# Patient Record
Sex: FEMALE | Race: WHITE | NOT HISPANIC OR LATINO | Employment: PART TIME | URBAN - METROPOLITAN AREA
[De-identification: names, ages, dates, MRNs, and addresses within clinical notes are randomized per-mention and may not be internally consistent; named-entity substitution may affect disease eponyms.]

---

## 2017-07-20 DIAGNOSIS — E55.9 VITAMIN D DEFICIENCY: Primary | ICD-10-CM

## 2017-07-20 LAB — 25(OH)D3+25(OH)D2 SERPL-MCNC: 26.3 NG/ML (ref 30–100)

## 2017-07-21 ENCOUNTER — TELEPHONE (OUTPATIENT)
Dept: INTERNAL MEDICINE | Facility: CLINIC | Age: 37
End: 2017-07-21

## 2017-07-21 NOTE — TELEPHONE ENCOUNTER
----- Message from Denice Gutierrez sent at 7/21/2017 10:20 AM EDT -----  Pt was in yesterday for fasting labs for her physical next week. She wants to know why they only did a Vitamin D lab? Phone: [phone]

## 2017-07-21 NOTE — TELEPHONE ENCOUNTER
Pt aware that there was a misunderstanding with her vit d lab only being drawn since the patient had never returned for a recheck. Pt was fine with just getting drawn at her CPE appt on 7/25/2017 & she will come fasting.

## 2017-11-13 DIAGNOSIS — Z00.00 HEALTHCARE MAINTENANCE: Primary | ICD-10-CM

## 2017-11-13 DIAGNOSIS — E55.9 VITAMIN D DEFICIENCY: ICD-10-CM

## 2017-11-13 DIAGNOSIS — R73.01 ELEVATED FASTING GLUCOSE: ICD-10-CM

## 2017-11-14 LAB
25(OH)D3+25(OH)D2 SERPL-MCNC: 33.1 NG/ML (ref 30–100)
ALBUMIN SERPL-MCNC: 4.7 G/DL (ref 3.5–5.2)
ALBUMIN/GLOB SERPL: 1.7 G/DL
ALP SERPL-CCNC: 48 U/L (ref 39–117)
ALT SERPL-CCNC: 9 U/L (ref 1–33)
AST SERPL-CCNC: 13 U/L (ref 1–32)
BASOPHILS # BLD AUTO: 0.01 10*3/MM3 (ref 0–0.2)
BASOPHILS NFR BLD AUTO: 0.2 % (ref 0–1.5)
BILIRUB SERPL-MCNC: 0.5 MG/DL (ref 0.1–1.2)
BUN SERPL-MCNC: 15 MG/DL (ref 6–20)
BUN/CREAT SERPL: 19.5 (ref 7–25)
CALCIUM SERPL-MCNC: 9.6 MG/DL (ref 8.6–10.5)
CHLORIDE SERPL-SCNC: 104 MMOL/L (ref 98–107)
CHOLEST SERPL-MCNC: 204 MG/DL (ref 0–200)
CHOLEST/HDLC SERPL: 4.08 {RATIO}
CO2 SERPL-SCNC: 25.3 MMOL/L (ref 22–29)
CREAT SERPL-MCNC: 0.77 MG/DL (ref 0.57–1)
EOSINOPHIL # BLD AUTO: 0.06 10*3/MM3 (ref 0–0.7)
EOSINOPHIL NFR BLD AUTO: 1.2 % (ref 0.3–6.2)
ERYTHROCYTE [DISTWIDTH] IN BLOOD BY AUTOMATED COUNT: 13 % (ref 11.7–13)
GFR SERPLBLD CREATININE-BSD FMLA CKD-EPI: 103 ML/MIN/1.73
GFR SERPLBLD CREATININE-BSD FMLA CKD-EPI: 85 ML/MIN/1.73
GLOBULIN SER CALC-MCNC: 2.7 GM/DL
GLUCOSE SERPL-MCNC: 103 MG/DL (ref 65–99)
HBA1C MFR BLD: 5.4 % (ref 4.8–5.6)
HCT VFR BLD AUTO: 40 % (ref 35.6–45.5)
HDLC SERPL-MCNC: 50 MG/DL (ref 40–60)
HGB BLD-MCNC: 12.8 G/DL (ref 11.9–15.5)
IMM GRANULOCYTES # BLD: 0 10*3/MM3 (ref 0–0.03)
IMM GRANULOCYTES NFR BLD: 0 % (ref 0–0.5)
LDLC SERPL CALC-MCNC: 132 MG/DL (ref 0–100)
LYMPHOCYTES # BLD AUTO: 1.99 10*3/MM3 (ref 0.9–4.8)
LYMPHOCYTES NFR BLD AUTO: 38.5 % (ref 19.6–45.3)
MCH RBC QN AUTO: 31.8 PG (ref 26.9–32)
MCHC RBC AUTO-ENTMCNC: 32 G/DL (ref 32.4–36.3)
MCV RBC AUTO: 99.5 FL (ref 80.5–98.2)
MONOCYTES # BLD AUTO: 0.32 10*3/MM3 (ref 0.2–1.2)
MONOCYTES NFR BLD AUTO: 6.2 % (ref 5–12)
NEUTROPHILS # BLD AUTO: 2.79 10*3/MM3 (ref 1.9–8.1)
NEUTROPHILS NFR BLD AUTO: 53.9 % (ref 42.7–76)
PLATELET # BLD AUTO: 235 10*3/MM3 (ref 140–500)
POTASSIUM SERPL-SCNC: 4.7 MMOL/L (ref 3.5–5.2)
PROT SERPL-MCNC: 7.4 G/DL (ref 6–8.5)
RBC # BLD AUTO: 4.02 10*6/MM3 (ref 3.9–5.2)
SODIUM SERPL-SCNC: 141 MMOL/L (ref 136–145)
TRIGL SERPL-MCNC: 108 MG/DL (ref 0–150)
VLDLC SERPL CALC-MCNC: 21.6 MG/DL (ref 5–40)
WBC # BLD AUTO: 5.17 10*3/MM3 (ref 4.5–10.7)

## 2017-11-17 ENCOUNTER — OFFICE VISIT (OUTPATIENT)
Dept: INTERNAL MEDICINE | Facility: CLINIC | Age: 37
End: 2017-11-17

## 2017-11-17 VITALS
HEIGHT: 62 IN | BODY MASS INDEX: 23.27 KG/M2 | WEIGHT: 126.44 LBS | DIASTOLIC BLOOD PRESSURE: 62 MMHG | SYSTOLIC BLOOD PRESSURE: 104 MMHG | HEART RATE: 85 BPM | OXYGEN SATURATION: 98 %

## 2017-11-17 DIAGNOSIS — Z00.00 HEALTHCARE MAINTENANCE: Primary | ICD-10-CM

## 2017-11-17 PROCEDURE — 99395 PREV VISIT EST AGE 18-39: CPT | Performed by: NURSE PRACTITIONER

## 2017-11-17 RX ORDER — CHOLECALCIFEROL (VITAMIN D3) 125 MCG
2 CAPSULE ORAL DAILY
COMMUNITY

## 2017-11-17 NOTE — PROGRESS NOTES
"Subjective   Sylvia Alvarado is a 37 y.o. female and is here for a comprehensive physical exam. The patient reports no problems.    Do you take any herbs or supplements that were not prescribed by a doctor? no  Are you taking aspirin daily? no     History:  She sees gynecology for her paps. She has not had a mammogram yet.     The following portions of the patient's history were reviewed and updated as appropriate: allergies, current medications, past family history, past medical history, past social history, past surgical history and problem list.    Review of Systems  Do you have pain that bothers you in your daily life? no  A comprehensive review of systems was negative.    Objective   /62 (BP Location: Left arm, Patient Position: Sitting, Cuff Size: Adult)  Pulse 85  Ht 62\" (157.5 cm)  Wt 126 lb 7 oz (57.4 kg)  SpO2 98%  BMI 23.13 kg/m2    General Appearance:    Alert, cooperative, no distress, appears stated age   Head:    Normocephalic, without obvious abnormality, atraumatic   Eyes:    PERRL, conjunctiva/corneas clear, EOM's intact, fundi     benign, both eyes   Ears:    Normal TM's and external ear canals, both ears   Nose:   Nares normal, septum midline, mucosa normal, no drainage    or sinus tenderness   Throat:   Lips, mucosa, and tongue normal; teeth and gums normal   Neck:   Supple, symmetrical, trachea midline, no adenopathy;     thyroid:  no enlargement/tenderness/nodules; no carotid    bruit or JVD   Back:     Symmetric, no curvature, ROM normal, no CVA tenderness   Lungs:     Clear to auscultation bilaterally, respirations unlabored   Chest Wall:    No tenderness or deformity    Heart:    Regular rate and rhythm, S1 and S2 normal, no murmur, rub   or gallop       Abdomen:     Soft, non-tender, bowel sounds active all four quadrants,     no masses, no organomegaly           Extremities:   Extremities normal, atraumatic, no cyanosis or edema   Pulses:   2+ and symmetric all extremities "   Skin:   Skin color, texture, turgor normal, no rashes or lesions   Lymph nodes:   Cervical, supraclavicular, and axillary nodes normal   Neurologic:   CNII-XII intact, normal strength, sensation and reflexes     throughout     Orders Only on 11/13/2017   Component Date Value Ref Range Status   • WBC 11/14/2017 5.17  4.50 - 10.70 10*3/mm3 Final   • RBC 11/14/2017 4.02  3.90 - 5.20 10*6/mm3 Final   • Hemoglobin 11/14/2017 12.8  11.9 - 15.5 g/dL Final   • Hematocrit 11/14/2017 40.0  35.6 - 45.5 % Final   • MCV 11/14/2017 99.5* 80.5 - 98.2 fL Final   • MCH 11/14/2017 31.8  26.9 - 32.0 pg Final   • MCHC 11/14/2017 32.0* 32.4 - 36.3 g/dL Final   • RDW 11/14/2017 13.0  11.7 - 13.0 % Final   • Platelets 11/14/2017 235  140 - 500 10*3/mm3 Final   • Neutrophil Rel % 11/14/2017 53.9  42.7 - 76.0 % Final   • Lymphocyte Rel % 11/14/2017 38.5  19.6 - 45.3 % Final   • Monocyte Rel % 11/14/2017 6.2  5.0 - 12.0 % Final   • Eosinophil Rel % 11/14/2017 1.2  0.3 - 6.2 % Final   • Basophil Rel % 11/14/2017 0.2  0.0 - 1.5 % Final   • Neutrophils Absolute 11/14/2017 2.79  1.90 - 8.10 10*3/mm3 Final   • Lymphocytes Absolute 11/14/2017 1.99  0.90 - 4.80 10*3/mm3 Final   • Monocytes Absolute 11/14/2017 0.32  0.20 - 1.20 10*3/mm3 Final   • Eosinophils Absolute 11/14/2017 0.06  0.00 - 0.70 10*3/mm3 Final   • Basophils Absolute 11/14/2017 0.01  0.00 - 0.20 10*3/mm3 Final   • Immature Granulocyte Rel % 11/14/2017 0.0  0.0 - 0.5 % Final   • Immature Grans Absolute 11/14/2017 0.00  0.00 - 0.03 10*3/mm3 Final   • Glucose 11/14/2017 103* 65 - 99 mg/dL Final   • BUN 11/14/2017 15  6 - 20 mg/dL Final   • Creatinine 11/14/2017 0.77  0.57 - 1.00 mg/dL Final   • eGFR Non  Am 11/14/2017 85  >60 mL/min/1.73 Final   • eGFR African Am 11/14/2017 103  >60 mL/min/1.73 Final   • BUN/Creatinine Ratio 11/14/2017 19.5  7.0 - 25.0 Final   • Sodium 11/14/2017 141  136 - 145 mmol/L Final   • Potassium 11/14/2017 4.7  3.5 - 5.2 mmol/L Final   • Chloride  11/14/2017 104  98 - 107 mmol/L Final   • Total CO2 11/14/2017 25.3  22.0 - 29.0 mmol/L Final   • Calcium 11/14/2017 9.6  8.6 - 10.5 mg/dL Final   • Total Protein 11/14/2017 7.4  6.0 - 8.5 g/dL Final   • Albumin 11/14/2017 4.70  3.50 - 5.20 g/dL Final   • Globulin 11/14/2017 2.7  gm/dL Final   • A/G Ratio 11/14/2017 1.7  g/dL Final   • Total Bilirubin 11/14/2017 0.5  0.1 - 1.2 mg/dL Final   • Alkaline Phosphatase 11/14/2017 48  39 - 117 U/L Final   • AST (SGOT) 11/14/2017 13  1 - 32 U/L Final   • ALT (SGPT) 11/14/2017 9  1 - 33 U/L Final   • Hemoglobin A1C 11/14/2017 5.40  4.80 - 5.60 % Final    Comment: Hemoglobin A1C Ranges:  Increased Risk for Diabetes  5.7% to 6.4%  Diabetes                     >= 6.5%  Diabetic Goal                < 7.0%     • 25 Hydroxy, Vitamin D 11/14/2017 33.1  30.0 - 100.0 ng/mL Final    Comment: Reference Range for Total Vitamin D 25(OH)  Deficiency    <20.0 ng/mL  Insufficiency 21-29 ng/mL  Sufficiency    ng/mL  Toxicity      >100 ng/ml        • Total Cholesterol 11/14/2017 204* 0 - 200 mg/dL Final   • Triglycerides 11/14/2017 108  0 - 150 mg/dL Final   • HDL Cholesterol 11/14/2017 50  40 - 60 mg/dL Final   • VLDL Cholesterol 11/14/2017 21.6  5 - 40 mg/dL Final   • LDL Cholesterol  11/14/2017 132* 0 - 100 mg/dL Final   • Chol/HDL Ratio 11/14/2017 4.08   Final     Reviewed lab results with patient.     Assessment/Plan   Healthy female exam.     1. Sylvia was seen today for annual exam.    Diagnoses and all orders for this visit:    Healthcare maintenance        2. Patient Counseling:  --Nutrition: Stressed importance of moderation in sodium/caffeine intake, saturated fat and cholesterol, caloric balance, sufficient intake of fresh fruits, vegetables, fiber, calcium, iron. Handout given on low cholesterol diet.   --Exercise: Stressed the importance of regular exercise. Patient does strength training, but not much cardio.  --Injury prevention: Discussed safety belts, safety  helmets, smoke detector.   --Dental health: Discussed importance of regular tooth brushing, flossing, and dental visits.  --Immunizations reviewed. TdaP within in the last 6 years      3. Discussed the patient's BMI with her.  The BMI is in the acceptable range  4. Follow up in one year to recheck cholesterol.

## 2018-12-18 DIAGNOSIS — E78.49 OTHER HYPERLIPIDEMIA: ICD-10-CM

## 2018-12-18 DIAGNOSIS — Z00.00 HEALTH CARE MAINTENANCE: Primary | ICD-10-CM

## 2018-12-18 DIAGNOSIS — E55.9 VITAMIN D DEFICIENCY: ICD-10-CM

## 2018-12-18 LAB
25(OH)D3+25(OH)D2 SERPL-MCNC: 42.4 NG/ML (ref 30–100)
ALBUMIN SERPL-MCNC: 4.9 G/DL (ref 3.5–5.2)
ALBUMIN/GLOB SERPL: 1.9 G/DL
ALP SERPL-CCNC: 50 U/L (ref 39–117)
ALT SERPL-CCNC: 7 U/L (ref 1–33)
AST SERPL-CCNC: 13 U/L (ref 1–32)
BILIRUB SERPL-MCNC: 0.4 MG/DL (ref 0.1–1.2)
BUN SERPL-MCNC: 17 MG/DL (ref 6–20)
BUN/CREAT SERPL: 19.8 (ref 7–25)
CALCIUM SERPL-MCNC: 9.6 MG/DL (ref 8.6–10.5)
CHLORIDE SERPL-SCNC: 105 MMOL/L (ref 98–107)
CHOLEST SERPL-MCNC: 201 MG/DL (ref 0–200)
CHOLEST/HDLC SERPL: 3.72 {RATIO}
CO2 SERPL-SCNC: 24.3 MMOL/L (ref 22–29)
CREAT SERPL-MCNC: 0.86 MG/DL (ref 0.57–1)
GLOBULIN SER CALC-MCNC: 2.6 GM/DL
GLUCOSE SERPL-MCNC: 103 MG/DL (ref 65–99)
HBA1C MFR BLD: 5 % (ref 4.8–5.6)
HDLC SERPL-MCNC: 54 MG/DL (ref 40–60)
LDLC SERPL CALC-MCNC: 124 MG/DL (ref 0–100)
POTASSIUM SERPL-SCNC: 4.4 MMOL/L (ref 3.5–5.2)
PROT SERPL-MCNC: 7.5 G/DL (ref 6–8.5)
SODIUM SERPL-SCNC: 141 MMOL/L (ref 136–145)
TRIGL SERPL-MCNC: 117 MG/DL (ref 0–150)
VLDLC SERPL CALC-MCNC: 23.4 MG/DL (ref 5–40)

## 2018-12-20 ENCOUNTER — OFFICE VISIT (OUTPATIENT)
Dept: INTERNAL MEDICINE | Facility: CLINIC | Age: 38
End: 2018-12-20

## 2018-12-20 VITALS
OXYGEN SATURATION: 100 % | SYSTOLIC BLOOD PRESSURE: 102 MMHG | HEIGHT: 62 IN | WEIGHT: 119.19 LBS | DIASTOLIC BLOOD PRESSURE: 68 MMHG | HEART RATE: 76 BPM | BODY MASS INDEX: 21.93 KG/M2

## 2018-12-20 DIAGNOSIS — Z00.00 HEALTHCARE MAINTENANCE: Primary | ICD-10-CM

## 2018-12-20 PROCEDURE — 99395 PREV VISIT EST AGE 18-39: CPT | Performed by: NURSE PRACTITIONER

## 2018-12-20 RX ORDER — SPIRONOLACTONE 50 MG/1
1.5 TABLET, FILM COATED ORAL
COMMUNITY
Start: 2018-12-19 | End: 2021-04-06

## 2018-12-20 NOTE — PROGRESS NOTES
"Subjective   Sylvia Alvarado is a 38 y.o. female and is here for a comprehensive physical exam.   The patient reports :  C/o epigastric pain that occurred when she bent over. She felt something like a \"muscle tightening.\" This happened twice. No heartburn. Denies any injury or new exercises.     Do you take any herbs or supplements that were not prescribed by a doctor? vitamin D  Are you taking aspirin daily? no     History:  Last pap April 2018. No mammogram yet    The following portions of the patient's history were reviewed and updated as appropriate: allergies, current medications, past family history, past medical history, past social history, past surgical history and problem list.    Review of Systems  Do you have pain that bothers you in your daily life? no  Pertinent items are noted in HPI.    Objective   /68 (BP Location: Left arm, Patient Position: Sitting, Cuff Size: Adult)   Pulse 76   Ht 157.5 cm (62\")   Wt 54.1 kg (119 lb 3 oz)   SpO2 100%   BMI 21.80 kg/m²     General Appearance:    Alert, cooperative, no distress, appears stated age   Head:    Normocephalic, without obvious abnormality, atraumatic   Eyes:    PERRL, conjunctiva/corneas clear, EOM's intact, fundi     benign, both eyes   Ears:    Normal TM's and external ear canals, both ears   Nose:   Nares normal, septum midline, mucosa normal, no drainage    or sinus tenderness   Throat:   Lips, mucosa, and tongue normal; teeth and gums normal   Neck:   Supple, symmetrical, trachea midline, no adenopathy;     thyroid:  no enlargement/tenderness/nodules; no carotid    bruit or JVD   Back:     Symmetric, no curvature, ROM normal, no CVA tenderness   Lungs:     Clear to auscultation bilaterally, respirations unlabored   Chest Wall:    No tenderness or deformity    Heart:    Regular rate and rhythm, S1 and S2 normal, no murmur, rub   or gallop       Abdomen:     Soft, non-tender, bowel sounds active all four quadrants,     no masses, no " organomegaly           Extremities:   Extremities normal, atraumatic, no cyanosis or edema   Pulses:   2+ and symmetric all extremities   Skin:   Skin color, texture, turgor normal, no rashes or lesions   Lymph nodes:   Cervical, supraclavicular, and axillary nodes normal   Neurologic:   CNII-XII intact, normal strength, sensation and reflexes     throughout     Orders Only on 12/18/2018   Component Date Value Ref Range Status   • Glucose 12/18/2018 103* 65 - 99 mg/dL Final   • BUN 12/18/2018 17  6 - 20 mg/dL Final   • Creatinine 12/18/2018 0.86  0.57 - 1.00 mg/dL Final   • eGFR Non  Am 12/18/2018 75  >60 mL/min/1.73 Final   • eGFR African Am 12/18/2018 90  >60 mL/min/1.73 Final   • BUN/Creatinine Ratio 12/18/2018 19.8  7.0 - 25.0 Final   • Sodium 12/18/2018 141  136 - 145 mmol/L Final   • Potassium 12/18/2018 4.4  3.5 - 5.2 mmol/L Final   • Chloride 12/18/2018 105  98 - 107 mmol/L Final   • Total CO2 12/18/2018 24.3  22.0 - 29.0 mmol/L Final   • Calcium 12/18/2018 9.6  8.6 - 10.5 mg/dL Final   • Total Protein 12/18/2018 7.5  6.0 - 8.5 g/dL Final   • Albumin 12/18/2018 4.90  3.50 - 5.20 g/dL Final   • Globulin 12/18/2018 2.6  gm/dL Final   • A/G Ratio 12/18/2018 1.9  g/dL Final   • Total Bilirubin 12/18/2018 0.4  0.1 - 1.2 mg/dL Final   • Alkaline Phosphatase 12/18/2018 50  39 - 117 U/L Final   • AST (SGOT) 12/18/2018 13  1 - 32 U/L Final   • ALT (SGPT) 12/18/2018 7  1 - 33 U/L Final   • Total Cholesterol 12/18/2018 201* 0 - 200 mg/dL Final   • Triglycerides 12/18/2018 117  0 - 150 mg/dL Final   • HDL Cholesterol 12/18/2018 54  40 - 60 mg/dL Final   • VLDL Cholesterol 12/18/2018 23.4  5 - 40 mg/dL Final   • LDL Cholesterol  12/18/2018 124* 0 - 100 mg/dL Final   • Chol/HDL Ratio 12/18/2018 3.72   Final   • Hemoglobin A1C 12/18/2018 5.00  4.80 - 5.60 % Final    Comment: Hemoglobin A1C Ranges:  Increased Risk for Diabetes  5.7% to 6.4%  Diabetes                     >= 6.5%  Diabetic Goal                < 7.0%      • 25 Hydroxy, Vitamin D 12/18/2018 42.4  30.0 - 100.0 ng/ml Final    Comment: Reference Range for Total Vitamin D 25(OH)  Deficiency    <20.0 ng/mL  Insufficiency 21-29 ng/mL  Sufficiency    ng/mL  Toxicity      >100 ng/ml          Reviewed labs with patient.        Assessment/Plan   Healthy female exam.      1. Sylvia was seen today for annual exam.    Diagnoses and all orders for this visit:    Healthcare maintenance        2. Patient Counseling:  --Nutrition: Stressed importance of moderation in sodium/caffeine intake, saturated fat and cholesterol, caloric balance, sufficient intake of fresh fruits, vegetables, fiber, calcium, iron.   --Exercise: Stressed the importance of regular exercise.   --Injury prevention: Discussed safety belts, safety helmets, smoke detector.   --Dental health: Discussed importance of regular tooth brushing, flossing, and dental visits.   --Immunizations reviewed.    3. Discussed the patient's BMI with her.  The BMI is in the acceptable range  4. Follow up in one year for physical and labs. She will call if she has any further episodes of abdominal discomfort.

## 2021-01-05 ENCOUNTER — TELEPHONE (OUTPATIENT)
Dept: INTERNAL MEDICINE | Facility: CLINIC | Age: 41
End: 2021-01-05

## 2021-01-05 NOTE — TELEPHONE ENCOUNTER
PATIENT HAS PCP OF DEVONTE ARAIZA BUT WANTS TO SWITCH TO DR CRONIN/PATIENT WANTED TO STATE SHE HAS NO ISSUES WITH ANYONE BUT FEELS DR CRONIN IS A BETTER FIT FOR HER NEEDS AND WOULD LIKE TO SWITCH HER PCP TO DR CRONIN/PLEASE CALL PATIENT TO COORDINATE SWITCHING PCP

## 2021-04-06 ENCOUNTER — TRANSCRIBE ORDERS (OUTPATIENT)
Dept: ADMINISTRATIVE | Facility: HOSPITAL | Age: 41
End: 2021-04-06

## 2021-04-06 ENCOUNTER — OFFICE VISIT (OUTPATIENT)
Dept: INTERNAL MEDICINE | Facility: CLINIC | Age: 41
End: 2021-04-06

## 2021-04-06 VITALS
WEIGHT: 129.3 LBS | SYSTOLIC BLOOD PRESSURE: 102 MMHG | DIASTOLIC BLOOD PRESSURE: 60 MMHG | TEMPERATURE: 97.8 F | HEIGHT: 62 IN | BODY MASS INDEX: 23.79 KG/M2 | OXYGEN SATURATION: 97 % | HEART RATE: 93 BPM

## 2021-04-06 DIAGNOSIS — Z13.6 ENCOUNTER FOR SCREENING FOR VASCULAR DISEASE: Primary | ICD-10-CM

## 2021-04-06 DIAGNOSIS — Z00.00 HEALTH CARE MAINTENANCE: Primary | ICD-10-CM

## 2021-04-06 DIAGNOSIS — Z20.822 EXPOSURE TO COVID-19 VIRUS: ICD-10-CM

## 2021-04-06 PROCEDURE — 99395 PREV VISIT EST AGE 18-39: CPT | Performed by: NURSE PRACTITIONER

## 2021-04-06 NOTE — PROGRESS NOTES
Subjective   Sylvia Alvarado is a 40 y.o. female.     History of Present Illness   The patient is here today for CPE and to establish care. She does eat healthy, she does exercise 3 times a week.     Would like COVID antibody test.    Acne- off spironolactone for several months  Anxiety- worse around periods, was on cymbalta, off for about 6 months    G-2 P-2   Dtrs are 9 and 6.     She takes vitality essential nutrition pack, MVI, Fish oil, Cellwise and probiotic, Vit D3 2000 IU daily   The following portions of the patient's history were reviewed and updated as appropriate: allergies, current medications, past family history, past medical history, past social history, past surgical history and problem list.    Review of Systems   Constitutional: Negative for chills, fatigue and fever.   HENT: Negative for ear pain, rhinorrhea and sore throat.    Eyes: Negative.    Respiratory: Negative for cough and shortness of breath.    Cardiovascular: Negative.    Gastrointestinal: Negative.    Endocrine: Negative for cold intolerance and heat intolerance.   Genitourinary: Positive for frequency. Negative for breast discharge, breast lump, breast pain, difficulty urinating, dyspareunia, dysuria, flank pain, genital sores, hematuria, pelvic pain and urgency.   Musculoskeletal: Negative.    Skin: Negative.    Allergic/Immunologic: Negative for environmental allergies and food allergies.   Neurological: Negative.    Hematological: Negative.    Psychiatric/Behavioral: Negative for dysphoric mood and suicidal ideas. The patient is not nervous/anxious.        Objective   Physical Exam  Constitutional:       Appearance: Normal appearance. She is well-developed.      Comments: Body mass index is 23.65 kg/m².     HENT:      Right Ear: Hearing, tympanic membrane, ear canal and external ear normal.      Left Ear: Hearing, tympanic membrane, ear canal and external ear normal.      Nose: Nose normal.      Mouth/Throat:      Pharynx:  Uvula midline.   Eyes:      General: Lids are normal.      Conjunctiva/sclera: Conjunctivae normal.      Pupils: Pupils are equal, round, and reactive to light.   Neck:      Thyroid: No thyroid mass or thyromegaly.      Vascular: No carotid bruit.      Trachea: Trachea normal.   Cardiovascular:      Rate and Rhythm: Normal rate and regular rhythm.      Pulses:           Carotid pulses are 1+ on the left side.     Heart sounds: Normal heart sounds.   Pulmonary:      Effort: Pulmonary effort is normal. No retractions.      Breath sounds: Normal breath sounds.   Chest:      Chest wall: No mass, lacerations, deformity, swelling, tenderness, crepitus or edema.      Breasts: Breasts are symmetrical.         Right: No inverted nipple, mass, nipple discharge, skin change or tenderness.         Left: No inverted nipple, mass, nipple discharge, skin change or tenderness.      Comments: L>R, baseline per patient  Abdominal:      General: Bowel sounds are normal.      Palpations: Abdomen is soft.      Tenderness: There is no abdominal tenderness.   Musculoskeletal:         General: Normal range of motion.      Cervical back: Normal range of motion.   Lymphadenopathy:      Cervical: No cervical adenopathy.      Upper Body:      Right upper body: No supraclavicular adenopathy.      Left upper body: No supraclavicular adenopathy.      Lower Body: No right inguinal adenopathy. No left inguinal adenopathy.   Skin:     General: Skin is warm and dry.   Neurological:      Mental Status: She is alert and oriented to person, place, and time.      GCS: GCS eye subscore is 4. GCS verbal subscore is 5. GCS motor subscore is 6.      Cranial Nerves: No cranial nerve deficit.      Sensory: No sensory deficit.      Deep Tendon Reflexes:      Reflex Scores:       Patellar reflexes are 2+ on the right side and 2+ on the left side.  Psychiatric:         Speech: Speech normal.         Behavior: Behavior normal. Behavior is cooperative.          Thought Content: Thought content normal.         Judgment: Judgment normal.         Vitals:    04/06/21 1257   BP: 102/60   Pulse: 93   Temp: 97.8 °F (36.6 °C)   SpO2: 97%     Body mass index is 23.65 kg/m².      Assessment/Plan   Diagnoses and all orders for this visit:    1. Health care maintenance (Primary)  -     CBC & Differential  -     Comprehensive Metabolic Panel  -     Hemoglobin A1c  -     Lipid Panel With LDL / HDL Ratio  -     TSH Rfx On Abnormal To Free T4  -     Vitamin D 25 Hydroxy  -     Hepatitis C Antibody    2. Exposure to COVID-19 virus  -     SARS-CoV-2 Antibodies ([facility])                 1. Preventative counseling- continue healthy diet and exercise  2. Hyperglycemia- check A1C, watch carbs, exercise   3. Bruit carotid- check US    COVID vaccine discussed  GYN- UTD  Fasting labs and will call

## 2021-04-09 LAB
25(OH)D3+25(OH)D2 SERPL-MCNC: 40.3 NG/ML (ref 30–100)
ALBUMIN SERPL-MCNC: 4.8 G/DL (ref 3.5–5.2)
ALBUMIN/GLOB SERPL: 1.9 G/DL
ALP SERPL-CCNC: 47 U/L (ref 39–117)
ALT SERPL-CCNC: 15 U/L (ref 1–33)
AST SERPL-CCNC: 17 U/L (ref 1–32)
BASOPHILS # BLD AUTO: 0.04 10*3/MM3 (ref 0–0.2)
BASOPHILS NFR BLD AUTO: 0.8 % (ref 0–1.5)
BILIRUB SERPL-MCNC: 0.5 MG/DL (ref 0–1.2)
BUN SERPL-MCNC: 20 MG/DL (ref 6–20)
BUN/CREAT SERPL: 25 (ref 7–25)
CALCIUM SERPL-MCNC: 10 MG/DL (ref 8.6–10.5)
CHLORIDE SERPL-SCNC: 106 MMOL/L (ref 98–107)
CHOLEST SERPL-MCNC: 183 MG/DL (ref 0–200)
CO2 SERPL-SCNC: 24.2 MMOL/L (ref 22–29)
CREAT SERPL-MCNC: 0.8 MG/DL (ref 0.57–1)
EOSINOPHIL # BLD AUTO: 0.06 10*3/MM3 (ref 0–0.4)
EOSINOPHIL NFR BLD AUTO: 1.3 % (ref 0.3–6.2)
ERYTHROCYTE [DISTWIDTH] IN BLOOD BY AUTOMATED COUNT: 12.1 % (ref 12.3–15.4)
GLOBULIN SER CALC-MCNC: 2.5 GM/DL
GLUCOSE SERPL-MCNC: 95 MG/DL (ref 65–99)
HBA1C MFR BLD: 5.3 % (ref 4.8–5.6)
HCT VFR BLD AUTO: 40.7 % (ref 34–46.6)
HDLC SERPL-MCNC: 56 MG/DL (ref 40–60)
HGB BLD-MCNC: 13.6 G/DL (ref 12–15.9)
IMM GRANULOCYTES # BLD AUTO: 0.01 10*3/MM3 (ref 0–0.05)
IMM GRANULOCYTES NFR BLD AUTO: 0.2 % (ref 0–0.5)
LDLC SERPL CALC-MCNC: 106 MG/DL (ref 0–100)
LDLC/HDLC SERPL: 1.85 {RATIO}
LYMPHOCYTES # BLD AUTO: 1.44 10*3/MM3 (ref 0.7–3.1)
LYMPHOCYTES NFR BLD AUTO: 30.6 % (ref 19.6–45.3)
MCH RBC QN AUTO: 31.9 PG (ref 26.6–33)
MCHC RBC AUTO-ENTMCNC: 33.4 G/DL (ref 31.5–35.7)
MCV RBC AUTO: 95.5 FL (ref 79–97)
MONOCYTES # BLD AUTO: 0.35 10*3/MM3 (ref 0.1–0.9)
MONOCYTES NFR BLD AUTO: 7.4 % (ref 5–12)
NEUTROPHILS # BLD AUTO: 2.81 10*3/MM3 (ref 1.7–7)
NEUTROPHILS NFR BLD AUTO: 59.7 % (ref 42.7–76)
NRBC BLD AUTO-RTO: 0 /100 WBC (ref 0–0.2)
PLATELET # BLD AUTO: 235 10*3/MM3 (ref 140–450)
POTASSIUM SERPL-SCNC: 4.7 MMOL/L (ref 3.5–5.2)
PROT SERPL-MCNC: 7.3 G/DL (ref 6–8.5)
RBC # BLD AUTO: 4.26 10*6/MM3 (ref 3.77–5.28)
SODIUM SERPL-SCNC: 140 MMOL/L (ref 136–145)
TRIGL SERPL-MCNC: 116 MG/DL (ref 0–150)
TSH SERPL DL<=0.005 MIU/L-ACNC: 2.23 UIU/ML (ref 0.27–4.2)
VLDLC SERPL CALC-MCNC: 21 MG/DL (ref 5–40)
WBC # BLD AUTO: 4.71 10*3/MM3 (ref 3.4–10.8)

## 2021-04-16 ENCOUNTER — BULK ORDERING (OUTPATIENT)
Dept: CASE MANAGEMENT | Facility: OTHER | Age: 41
End: 2021-04-16

## 2021-04-16 DIAGNOSIS — Z23 IMMUNIZATION DUE: ICD-10-CM

## 2021-05-18 ENCOUNTER — HOSPITAL ENCOUNTER (OUTPATIENT)
Dept: CARDIOLOGY | Facility: HOSPITAL | Age: 41
Discharge: HOME | End: 2021-05-18
Admitting: SURGERY

## 2021-05-18 VITALS
DIASTOLIC BLOOD PRESSURE: 59 MMHG | HEART RATE: 75 BPM | BODY MASS INDEX: 23 KG/M2 | SYSTOLIC BLOOD PRESSURE: 115 MMHG | WEIGHT: 125 LBS | HEIGHT: 62 IN

## 2021-05-18 DIAGNOSIS — Z13.6 ENCOUNTER FOR SCREENING FOR VASCULAR DISEASE: ICD-10-CM

## 2021-05-18 LAB
BH CV VAS BP LEFT ARM: NORMAL MMHG
BH CV VAS BP RIGHT ARM: NORMAL MMHG
BH CV XLRA MEAS LEFT ICA/CCA RATIO: 0.77
BH CV XLRA MEAS LEFT MID CCA PSV: NORMAL CM/SEC
BH CV XLRA MEAS LEFT MID ICA PSV: NORMAL CM/SEC
BH CV XLRA MEAS LEFT PROX ECA PSV: NORMAL CM/SEC
BH CV XLRA MEAS RIGHT ICA/CCA RATIO: 1.11
BH CV XLRA MEAS RIGHT MID CCA PSV: NORMAL CM/SEC
BH CV XLRA MEAS RIGHT MID ICA PSV: NORMAL CM/SEC
BH CV XLRA MEAS RIGHT PROX ECA PSV: NORMAL CM/SEC

## 2021-05-18 PROCEDURE — 93799 UNLISTED CV SVC/PROCEDURE: CPT

## 2021-10-18 ENCOUNTER — TELEPHONE (OUTPATIENT)
Dept: INTERNAL MEDICINE | Facility: CLINIC | Age: 41
End: 2021-10-18

## 2021-10-18 NOTE — TELEPHONE ENCOUNTER
PATIENT HAD DROPPED OF A BIOMETRIC SCREENING FORM ON OCT. 5TH. AND THEY HAVE NOT RECEIVED IT . CAN SHE COME BY AND  THE FORM.    [phone]

## 2022-04-04 DIAGNOSIS — Z00.00 HEALTH CARE MAINTENANCE: Primary | ICD-10-CM

## 2022-04-04 DIAGNOSIS — Z11.59 ENCOUNTER FOR HEPATITIS C SCREENING TEST FOR LOW RISK PATIENT: ICD-10-CM

## 2022-04-06 LAB
ALBUMIN SERPL-MCNC: 4.3 G/DL (ref 3.8–4.8)
ALBUMIN/GLOB SERPL: 1.6 {RATIO} (ref 1.2–2.2)
ALP SERPL-CCNC: 60 IU/L (ref 44–121)
ALT SERPL-CCNC: 12 IU/L (ref 0–32)
AST SERPL-CCNC: 18 IU/L (ref 0–40)
BASOPHILS # BLD AUTO: 0 X10E3/UL (ref 0–0.2)
BASOPHILS NFR BLD AUTO: 0 %
BILIRUB SERPL-MCNC: 0.3 MG/DL (ref 0–1.2)
BUN SERPL-MCNC: 15 MG/DL (ref 6–20)
BUN/CREAT SERPL: 19 (ref 9–23)
CALCIUM SERPL-MCNC: 9.5 MG/DL (ref 8.7–10.2)
CHLORIDE SERPL-SCNC: 104 MMOL/L (ref 96–106)
CHOLEST SERPL-MCNC: 185 MG/DL (ref 100–199)
CHOLEST/HDLC SERPL: 4.4 RATIO (ref 0–4.4)
CO2 SERPL-SCNC: 22 MMOL/L (ref 20–29)
CREAT SERPL-MCNC: 0.81 MG/DL (ref 0.57–1)
EGFRCR SERPLBLD CKD-EPI 2021: 95 ML/MIN/1.73
EOSINOPHIL # BLD AUTO: 0.1 X10E3/UL (ref 0–0.4)
EOSINOPHIL NFR BLD AUTO: 2 %
ERYTHROCYTE [DISTWIDTH] IN BLOOD BY AUTOMATED COUNT: 11.9 % (ref 11.7–15.4)
GLOBULIN SER CALC-MCNC: 2.7 G/DL (ref 1.5–4.5)
GLUCOSE SERPL-MCNC: 101 MG/DL (ref 65–99)
HCT VFR BLD AUTO: 39.4 % (ref 34–46.6)
HCV AB S/CO SERPL IA: <0.1 S/CO RATIO (ref 0–0.9)
HDLC SERPL-MCNC: 42 MG/DL
HGB BLD-MCNC: 13 G/DL (ref 11.1–15.9)
IMM GRANULOCYTES # BLD AUTO: 0 X10E3/UL (ref 0–0.1)
IMM GRANULOCYTES NFR BLD AUTO: 0 %
LDLC SERPL CALC-MCNC: 120 MG/DL (ref 0–99)
LYMPHOCYTES # BLD AUTO: 1.3 X10E3/UL (ref 0.7–3.1)
LYMPHOCYTES NFR BLD AUTO: 45 %
MCH RBC QN AUTO: 31.4 PG (ref 26.6–33)
MCHC RBC AUTO-ENTMCNC: 33 G/DL (ref 31.5–35.7)
MCV RBC AUTO: 95 FL (ref 79–97)
MONOCYTES # BLD AUTO: 0.4 X10E3/UL (ref 0.1–0.9)
MONOCYTES NFR BLD AUTO: 13 %
NEUTROPHILS # BLD AUTO: 1.2 X10E3/UL (ref 1.4–7)
NEUTROPHILS NFR BLD AUTO: 40 %
PLATELET # BLD AUTO: 240 X10E3/UL (ref 150–450)
POTASSIUM SERPL-SCNC: 4.7 MMOL/L (ref 3.5–5.2)
PROT SERPL-MCNC: 7 G/DL (ref 6–8.5)
RBC # BLD AUTO: 4.14 X10E6/UL (ref 3.77–5.28)
SODIUM SERPL-SCNC: 140 MMOL/L (ref 134–144)
TRIGL SERPL-MCNC: 128 MG/DL (ref 0–149)
TSH SERPL DL<=0.005 MIU/L-ACNC: 2.48 UIU/ML (ref 0.45–4.5)
VLDLC SERPL CALC-MCNC: 23 MG/DL (ref 5–40)
WBC # BLD AUTO: 2.9 X10E3/UL (ref 3.4–10.8)

## 2022-04-07 LAB
HBA1C MFR BLD: 5.5 % (ref 4.8–5.6)
Lab: NORMAL
WRITTEN AUTHORIZATION: NORMAL

## 2022-05-18 ENCOUNTER — OFFICE VISIT (OUTPATIENT)
Dept: INTERNAL MEDICINE | Facility: CLINIC | Age: 42
End: 2022-05-18

## 2022-05-18 VITALS
DIASTOLIC BLOOD PRESSURE: 60 MMHG | OXYGEN SATURATION: 98 % | TEMPERATURE: 97 F | HEART RATE: 85 BPM | BODY MASS INDEX: 23.83 KG/M2 | SYSTOLIC BLOOD PRESSURE: 98 MMHG | HEIGHT: 62 IN | WEIGHT: 129.5 LBS

## 2022-05-18 DIAGNOSIS — F41.9 ANXIETY: ICD-10-CM

## 2022-05-18 DIAGNOSIS — E55.9 VITAMIN D DEFICIENCY: ICD-10-CM

## 2022-05-18 DIAGNOSIS — Z00.00 HEALTH CARE MAINTENANCE: Primary | ICD-10-CM

## 2022-05-18 DIAGNOSIS — D72.819 LEUKOPENIA, UNSPECIFIED TYPE: ICD-10-CM

## 2022-05-18 DIAGNOSIS — R73.9 HYPERGLYCEMIA: ICD-10-CM

## 2022-05-18 PROCEDURE — 99395 PREV VISIT EST AGE 18-39: CPT | Performed by: NURSE PRACTITIONER

## 2022-05-18 RX ORDER — BUSPIRONE HYDROCHLORIDE 5 MG/1
5 TABLET ORAL 2 TIMES DAILY PRN
Qty: 60 TABLET | Refills: 4 | Status: SHIPPED | OUTPATIENT
Start: 2022-05-18 | End: 2022-06-14 | Stop reason: SDUPTHER

## 2022-05-18 NOTE — PROGRESS NOTES
Subjective   Sylvia Alvarado is a 41 y.o. female  CPE.     History of Present Illness   The patient is here today for CPE and lab work F/U. She is feeling healthy. She exercises 4 days a week, strength training, eating healthy.     Had a stomach bug a few days prior to lab work.     G-2 P-2   Dtrs are 10 and 7.   The following portions of the patient's history were reviewed and updated as appropriate: allergies, current medications, past family history, past medical history, past social history, past surgical history and problem list.    Review of Systems   Constitutional: Negative for chills, fatigue and fever.   HENT: Negative for ear pain, rhinorrhea and sore throat.    Eyes: Negative.    Respiratory: Negative for cough and shortness of breath.    Cardiovascular: Negative.    Gastrointestinal: Negative.    Endocrine: Negative for cold intolerance and heat intolerance.   Genitourinary: Negative for breast discharge, breast lump, breast pain, difficulty urinating, dyspareunia, dysuria, flank pain, frequency, genital sores, hematuria, pelvic pain and urgency.   Musculoskeletal: Negative.    Skin: Negative.    Allergic/Immunologic: Negative for environmental allergies and food allergies.   Neurological: Negative.    Hematological: Negative.    Psychiatric/Behavioral: Negative for dysphoric mood and suicidal ideas. The patient is nervous/anxious (worse around period).        Objective   Physical Exam  Constitutional:       Appearance: Normal appearance. She is well-developed.      Comments: Body mass index is 23.68 kg/m².     HENT:      Right Ear: Hearing, tympanic membrane, ear canal and external ear normal.      Left Ear: Hearing, tympanic membrane, ear canal and external ear normal.   Eyes:      General: Lids are normal.      Conjunctiva/sclera: Conjunctivae normal.      Pupils: Pupils are equal, round, and reactive to light.   Neck:      Thyroid: No thyroid mass or thyromegaly.      Vascular: No carotid bruit.       Trachea: Trachea normal.   Cardiovascular:      Rate and Rhythm: Normal rate and regular rhythm.      Pulses: Normal pulses.      Heart sounds: Normal heart sounds.   Pulmonary:      Effort: Pulmonary effort is normal.      Breath sounds: Normal breath sounds.   Chest:   Breasts:      Right: No supraclavicular adenopathy.      Left: No supraclavicular adenopathy.       Abdominal:      General: Bowel sounds are normal.      Palpations: Abdomen is soft.      Tenderness: There is no abdominal tenderness.   Musculoskeletal:         General: Normal range of motion.      Cervical back: Normal range of motion.   Lymphadenopathy:      Cervical: No cervical adenopathy.      Upper Body:      Right upper body: No supraclavicular adenopathy.      Left upper body: No supraclavicular adenopathy.      Lower Body: No right inguinal adenopathy. No left inguinal adenopathy.   Skin:     General: Skin is warm and dry.   Neurological:      Mental Status: She is alert and oriented to person, place, and time.      GCS: GCS eye subscore is 4. GCS verbal subscore is 5. GCS motor subscore is 6.      Cranial Nerves: No cranial nerve deficit.      Sensory: No sensory deficit.      Deep Tendon Reflexes:      Reflex Scores:       Patellar reflexes are 2+ on the right side and 2+ on the left side.  Psychiatric:         Speech: Speech normal.         Behavior: Behavior normal. Behavior is cooperative.         Thought Content: Thought content normal.         Judgment: Judgment normal.         Vitals:    05/18/22 1036   BP: 98/60   Pulse: 85   Temp: 97 °F (36.1 °C)   SpO2: 98%     Body mass index is 23.68 kg/m².   Orders Only on 04/04/2022   Component Date Value Ref Range Status   • WBC 04/05/2022 2.9 (A) 3.4 - 10.8 x10E3/uL Final   • RBC 04/05/2022 4.14  3.77 - 5.28 x10E6/uL Final   • Hemoglobin 04/05/2022 13.0  11.1 - 15.9 g/dL Final   • Hematocrit 04/05/2022 39.4  34.0 - 46.6 % Final   • MCV 04/05/2022 95  79 - 97 fL Final   • MCH 04/05/2022  31.4  26.6 - 33.0 pg Final   • MCHC 04/05/2022 33.0  31.5 - 35.7 g/dL Final   • RDW 04/05/2022 11.9  11.7 - 15.4 % Final   • Platelets 04/05/2022 240  150 - 450 x10E3/uL Final   • Neutrophil Rel % 04/05/2022 40  Not Estab. % Final   • Lymphocyte Rel % 04/05/2022 45  Not Estab. % Final   • Monocyte Rel % 04/05/2022 13  Not Estab. % Final   • Eosinophil Rel % 04/05/2022 2  Not Estab. % Final   • Basophil Rel % 04/05/2022 0  Not Estab. % Final   • Neutrophils Absolute 04/05/2022 1.2 (A) 1.4 - 7.0 x10E3/uL Final   • Lymphocytes Absolute 04/05/2022 1.3  0.7 - 3.1 x10E3/uL Final   • Monocytes Absolute 04/05/2022 0.4  0.1 - 0.9 x10E3/uL Final   • Eosinophils Absolute 04/05/2022 0.1  0.0 - 0.4 x10E3/uL Final   • Basophils Absolute 04/05/2022 0.0  0.0 - 0.2 x10E3/uL Final   • Immature Granulocyte Rel % 04/05/2022 0  Not Estab. % Final   • Immature Grans Absolute 04/05/2022 0.0  0.0 - 0.1 x10E3/uL Final   • TSH 04/05/2022 2.480  0.450 - 4.500 uIU/mL Final   • Total Cholesterol 04/05/2022 185  100 - 199 mg/dL Final   • Triglycerides 04/05/2022 128  0 - 149 mg/dL Final   • HDL Cholesterol 04/05/2022 42  >39 mg/dL Final   • VLDL Cholesterol Cirilo 04/05/2022 23  5 - 40 mg/dL Final   • LDL Chol Calc ([facility]) 04/05/2022 120 (A) 0 - 99 mg/dL Final   • Chol/HDL Ratio 04/05/2022 4.4  0.0 - 4.4 ratio Final    Comment:                                   T. Chol/HDL Ratio                                              Men  Women                                1/2 Avg.Risk  3.4    3.3                                    Avg.Risk  5.0    4.4                                 2X Avg.Risk  9.6    7.1                                 3X Avg.Risk 23.4   11.0     • Glucose 04/05/2022 101 (A) 65 - 99 mg/dL Final   • BUN 04/05/2022 15  6 - 20 mg/dL Final   • Creatinine 04/05/2022 0.81  0.57 - 1.00 mg/dL Final   • EGFR Result 04/05/2022 95  >59 mL/min/1.73 Final   • BUN/Creatinine Ratio 04/05/2022 19  9 - 23 Final   • Sodium 04/05/2022 140  134 - 144 mmol/L  Final   • Potassium 04/05/2022 4.7  3.5 - 5.2 mmol/L Final   • Chloride 04/05/2022 104  96 - 106 mmol/L Final   • Total CO2 04/05/2022 22  20 - 29 mmol/L Final   • Calcium 04/05/2022 9.5  8.7 - 10.2 mg/dL Final   • Total Protein 04/05/2022 7.0  6.0 - 8.5 g/dL Final   • Albumin 04/05/2022 4.3  3.8 - 4.8 g/dL Final   • Globulin 04/05/2022 2.7  1.5 - 4.5 g/dL Final   • A/G Ratio 04/05/2022 1.6  1.2 - 2.2 Final   • Total Bilirubin 04/05/2022 0.3  0.0 - 1.2 mg/dL Final   • Alkaline Phosphatase 04/05/2022 60  44 - 121 IU/L Final   • AST (SGOT) 04/05/2022 18  0 - 40 IU/L Final   • ALT (SGPT) 04/05/2022 12  0 - 32 IU/L Final   • Hep C Virus Ab 04/05/2022 <0.1  0.0 - 0.9 s/co ratio Final    Comment:                                   Negative:     < 0.8                               Indeterminate: 0.8 - 0.9                                    Positive:     > 0.9   The CDC recommends that a positive HCV antibody result   be followed up with a HCV Nucleic Acid Amplification   test ([id number]).     • Hemoglobin A1C 04/05/2022 5.5  4.8 - 5.6 % Final    Comment:          Prediabetes: 5.7 - 6.4           Diabetes: >6.4           Glycemic control for adults with diabetes: <7.0     • Please note 04/05/2022 Comment   Final    Comment: We have received your request for additional testing or test  verification.  You will be notified if we are unable to process  your request.     • Written Authorization 04/05/2022 Comment   Final    Comment: Written Authorization Received.  Authorization received from written authorization 04-  Logged by Evon Collins         Current Outpatient Medications:   •  busPIRone (BUSPAR) 5 MG tablet, Take 1 tablet by mouth 2 (Two) Times a Day As Needed (anxiety)., Disp: 60 tablet, Rfl: 4  •  Cholecalciferol (VITAMIN D3) 2000 units tablet, Take 2 tablets by mouth Daily., Disp: , Rfl:     Assessment & Plan   Diagnoses and all orders for this visit:    1. Health care maintenance (Primary)  -     CBC &  Differential; Future  -     Comprehensive Metabolic Panel; Future  -     Hemoglobin A1c; Future  -     Lipid Panel With LDL / HDL Ratio; Future  -     TSH Rfx On Abnormal To Free T4; Future  -     Vitamin D 25 Hydroxy; Future    2. Hyperglycemia  -     CBC & Differential; Future  -     Comprehensive Metabolic Panel; Future  -     Hemoglobin A1c; Future  -     Lipid Panel With LDL / HDL Ratio; Future  -     TSH Rfx On Abnormal To Free T4; Future  -     Vitamin D 25 Hydroxy; Future    3. Vitamin D deficiency  -     CBC & Differential; Future  -     Comprehensive Metabolic Panel; Future  -     Hemoglobin A1c; Future  -     Lipid Panel With LDL / HDL Ratio; Future  -     TSH Rfx On Abnormal To Free T4; Future  -     Vitamin D 25 Hydroxy; Future    4. Leukopenia, unspecified type  -     CBC & Differential; Future  -     Comprehensive Metabolic Panel; Future  -     Hemoglobin A1c; Future  -     Lipid Panel With LDL / HDL Ratio; Future  -     TSH Rfx On Abnormal To Free T4; Future  -     Vitamin D 25 Hydroxy; Future  -     CBC & Differential; Future    5. Anxiety  -     CBC & Differential; Future  -     Comprehensive Metabolic Panel; Future  -     Hemoglobin A1c; Future  -     Lipid Panel With LDL / HDL Ratio; Future  -     TSH Rfx On Abnormal To Free T4; Future  -     Vitamin D 25 Hydroxy; Future  -     busPIRone (BUSPAR) 5 MG tablet; Take 1 tablet by mouth 2 (Two) Times a Day As Needed (anxiety).  Dispense: 60 tablet; Refill: 4                 1. Preventative counseling- cut back on red meats/cheeses, increase cardio  2. Anxiety- could try buspar  3. Leukopenia- recheck in 30 days, likely due to recent gastroenteritis  4. Hyperglycemia- increase cardio, cut back carbs, recheck in 1 yr     GYN- UTD

## 2022-06-14 DIAGNOSIS — F41.9 ANXIETY: ICD-10-CM

## 2022-06-14 RX ORDER — BUSPIRONE HYDROCHLORIDE 5 MG/1
5 TABLET ORAL 2 TIMES DAILY PRN
Qty: 180 TABLET | Refills: 1 | Status: SHIPPED | OUTPATIENT
Start: 2022-06-14

## 2022-09-14 ENCOUNTER — APPOINTMENT (OUTPATIENT)
Dept: WOMENS IMAGING | Facility: HOSPITAL | Age: 42
End: 2022-09-14

## 2022-09-14 PROCEDURE — 77067 SCR MAMMO BI INCL CAD: CPT | Performed by: RADIOLOGY

## 2022-09-14 PROCEDURE — 77063 BREAST TOMOSYNTHESIS BI: CPT | Performed by: RADIOLOGY

## 2022-09-27 ENCOUNTER — APPOINTMENT (OUTPATIENT)
Dept: WOMENS IMAGING | Facility: HOSPITAL | Age: 42
End: 2022-09-27

## 2022-09-27 PROCEDURE — 76642 ULTRASOUND BREAST LIMITED: CPT | Performed by: RADIOLOGY

## 2022-09-27 PROCEDURE — 77065 DX MAMMO INCL CAD UNI: CPT | Performed by: RADIOLOGY

## 2022-09-27 PROCEDURE — G0279 TOMOSYNTHESIS, MAMMO: HCPCS | Performed by: RADIOLOGY

## 2022-09-27 PROCEDURE — 77061 BREAST TOMOSYNTHESIS UNI: CPT | Performed by: RADIOLOGY

## 2024-04-24 ENCOUNTER — LAB (OUTPATIENT)
Dept: INTERNAL MEDICINE | Facility: CLINIC | Age: 44
End: 2024-04-24
Payer: COMMERCIAL

## 2024-04-24 DIAGNOSIS — E55.9 VITAMIN D DEFICIENCY: Primary | ICD-10-CM

## 2024-04-24 DIAGNOSIS — E78.5 HYPERLIPIDEMIA, UNSPECIFIED HYPERLIPIDEMIA TYPE: ICD-10-CM

## 2024-04-24 DIAGNOSIS — R73.9 HYPERGLYCEMIA: ICD-10-CM

## 2024-04-24 LAB
25(OH)D3+25(OH)D2 SERPL-MCNC: 32.1 NG/ML (ref 30–100)
ALBUMIN SERPL-MCNC: 4.8 G/DL (ref 3.5–5.2)
ALBUMIN/GLOB SERPL: 2.1 G/DL
ALP SERPL-CCNC: 51 U/L (ref 39–117)
ALT SERPL-CCNC: 19 U/L (ref 1–33)
AST SERPL-CCNC: 18 U/L (ref 1–32)
BASOPHILS # BLD AUTO: 0.01 10*3/MM3 (ref 0–0.2)
BASOPHILS NFR BLD AUTO: 0.2 % (ref 0–1.5)
BILIRUB SERPL-MCNC: 0.4 MG/DL (ref 0–1.2)
BUN SERPL-MCNC: 14 MG/DL (ref 6–20)
BUN/CREAT SERPL: 16.7 (ref 7–25)
CALCIUM SERPL-MCNC: 9.7 MG/DL (ref 8.6–10.5)
CHLORIDE SERPL-SCNC: 105 MMOL/L (ref 98–107)
CHOLEST SERPL-MCNC: 217 MG/DL (ref 0–200)
CHOLEST/HDLC SERPL: 3.19 {RATIO}
CO2 SERPL-SCNC: 24.6 MMOL/L (ref 22–29)
CREAT SERPL-MCNC: 0.84 MG/DL (ref 0.57–1)
EGFRCR SERPLBLD CKD-EPI 2021: 89.7 ML/MIN/1.73
EOSINOPHIL # BLD AUTO: 0.09 10*3/MM3 (ref 0–0.4)
EOSINOPHIL NFR BLD AUTO: 2.2 % (ref 0.3–6.2)
ERYTHROCYTE [DISTWIDTH] IN BLOOD BY AUTOMATED COUNT: 11.8 % (ref 12.3–15.4)
GLOBULIN SER CALC-MCNC: 2.3 GM/DL
GLUCOSE SERPL-MCNC: 103 MG/DL (ref 65–99)
HBA1C MFR BLD: 5.4 % (ref 4.8–5.6)
HCT VFR BLD AUTO: 40.4 % (ref 34–46.6)
HDLC SERPL-MCNC: 68 MG/DL (ref 40–60)
HGB BLD-MCNC: 13.6 G/DL (ref 12–15.9)
IMM GRANULOCYTES # BLD AUTO: 0 10*3/MM3 (ref 0–0.05)
IMM GRANULOCYTES NFR BLD AUTO: 0 % (ref 0–0.5)
LDLC SERPL CALC-MCNC: 131 MG/DL (ref 0–100)
LYMPHOCYTES # BLD AUTO: 1.69 10*3/MM3 (ref 0.7–3.1)
LYMPHOCYTES NFR BLD AUTO: 41.6 % (ref 19.6–45.3)
MCH RBC QN AUTO: 32.5 PG (ref 26.6–33)
MCHC RBC AUTO-ENTMCNC: 33.7 G/DL (ref 31.5–35.7)
MCV RBC AUTO: 96.4 FL (ref 79–97)
MONOCYTES # BLD AUTO: 0.27 10*3/MM3 (ref 0.1–0.9)
MONOCYTES NFR BLD AUTO: 6.7 % (ref 5–12)
NEUTROPHILS # BLD AUTO: 2 10*3/MM3 (ref 1.7–7)
NEUTROPHILS NFR BLD AUTO: 49.3 % (ref 42.7–76)
NRBC BLD AUTO-RTO: 0 /100 WBC (ref 0–0.2)
PLATELET # BLD AUTO: 224 10*3/MM3 (ref 140–450)
POTASSIUM SERPL-SCNC: 4.6 MMOL/L (ref 3.5–5.2)
PROT SERPL-MCNC: 7.1 G/DL (ref 6–8.5)
RBC # BLD AUTO: 4.19 10*6/MM3 (ref 3.77–5.28)
SODIUM SERPL-SCNC: 139 MMOL/L (ref 136–145)
TRIGL SERPL-MCNC: 103 MG/DL (ref 0–150)
TSH SERPL DL<=0.005 MIU/L-ACNC: 1.81 UIU/ML (ref 0.27–4.2)
VLDLC SERPL CALC-MCNC: 18 MG/DL (ref 5–40)
WBC # BLD AUTO: 4.06 10*3/MM3 (ref 3.4–10.8)

## 2024-05-01 ENCOUNTER — OFFICE VISIT (OUTPATIENT)
Dept: INTERNAL MEDICINE | Facility: CLINIC | Age: 44
End: 2024-05-01
Payer: COMMERCIAL

## 2024-05-01 VITALS
WEIGHT: 121 LBS | DIASTOLIC BLOOD PRESSURE: 60 MMHG | SYSTOLIC BLOOD PRESSURE: 98 MMHG | HEART RATE: 78 BPM | HEIGHT: 62 IN | OXYGEN SATURATION: 98 % | BODY MASS INDEX: 22.26 KG/M2

## 2024-05-01 DIAGNOSIS — E55.9 VITAMIN D DEFICIENCY: ICD-10-CM

## 2024-05-01 DIAGNOSIS — R73.9 HYPERGLYCEMIA: ICD-10-CM

## 2024-05-01 DIAGNOSIS — Z00.00 HEALTH CARE MAINTENANCE: Primary | ICD-10-CM

## 2024-05-01 PROBLEM — Z15.89 MTHFR GENE MUTATION: Status: ACTIVE | Noted: 2024-05-01

## 2024-05-01 PROCEDURE — 99396 PREV VISIT EST AGE 40-64: CPT | Performed by: NURSE PRACTITIONER

## 2024-05-01 RX ORDER — MULTIPLE VITAMINS W/ MINERALS TAB 9MG-400MCG
1 TAB ORAL DAILY
COMMUNITY

## 2024-05-01 NOTE — PROGRESS NOTES
Subjective   Sylvia Alvarado is a 43 y.o. female.     History of Present Illness   The patient is here today for CPE and lab work F/U. Still strength training and eating healthy.     BMI is within normal parameters. No other follow-up for BMI required.     Saw a functional med doc. She had a CT calcium score 0. Had inflammation in her gut, gave up dairy and gluten for a while. She feels better with less of these things.   MTHFR as well, told no genetic risk.     Dtrs ar 12 and 9.   The following portions of the patient's history were reviewed and updated as appropriate: allergies, current medications, past family history, past medical history, past social history, past surgical history and problem list.    Review of Systems   Constitutional:  Negative for chills, fatigue and fever.   HENT:  Negative for ear pain, rhinorrhea and sore throat.    Eyes: Negative.    Respiratory:  Negative for cough and shortness of breath.    Cardiovascular: Negative.    Gastrointestinal: Negative.    Endocrine: Negative for cold intolerance and heat intolerance.   Genitourinary:  Negative for breast discharge, breast lump, breast pain, difficulty urinating, dyspareunia, dysuria, flank pain, frequency, genital sores, hematuria, pelvic pain and urgency.   Musculoskeletal: Negative.    Skin: Negative.    Allergic/Immunologic: Negative for environmental allergies and food allergies.   Neurological: Negative.    Hematological: Negative.    Psychiatric/Behavioral:  Negative for dysphoric mood and suicidal ideas. The patient is nervous/anxious (managed).        Objective   Physical Exam  Constitutional:       Appearance: Normal appearance. She is well-developed.      Comments: Body mass index is 22.13 kg/m².     HENT:      Right Ear: Hearing, tympanic membrane, ear canal and external ear normal.      Left Ear: Hearing, tympanic membrane, ear canal and external ear normal.   Eyes:      General: Lids are normal.      Conjunctiva/sclera:  Conjunctivae normal.      Pupils: Pupils are equal, round, and reactive to light.   Neck:      Thyroid: No thyroid mass or thyromegaly.      Vascular: No carotid bruit.      Trachea: Trachea normal.   Cardiovascular:      Rate and Rhythm: Normal rate and regular rhythm.      Pulses: Normal pulses.      Heart sounds: Normal heart sounds.   Pulmonary:      Effort: Pulmonary effort is normal.      Breath sounds: Normal breath sounds.   Abdominal:      General: Bowel sounds are normal.      Palpations: Abdomen is soft.      Tenderness: There is no abdominal tenderness.   Musculoskeletal:         General: Normal range of motion.      Cervical back: Normal range of motion.   Lymphadenopathy:      Cervical: No cervical adenopathy.      Upper Body:      Right upper body: No supraclavicular adenopathy.      Left upper body: No supraclavicular adenopathy.      Lower Body: No right inguinal adenopathy. No left inguinal adenopathy.   Skin:     General: Skin is warm and dry.   Neurological:      Mental Status: She is alert and oriented to person, place, and time.      GCS: GCS eye subscore is 4. GCS verbal subscore is 5. GCS motor subscore is 6.      Cranial Nerves: No cranial nerve deficit.      Sensory: No sensory deficit.      Deep Tendon Reflexes:      Reflex Scores:       Patellar reflexes are 2+ on the right side and 2+ on the left side.  Psychiatric:         Speech: Speech normal.         Behavior: Behavior normal. Behavior is cooperative.         Thought Content: Thought content normal.         Judgment: Judgment normal.         Vitals:    05/01/24 1319   BP: 98/60   Pulse: 78   SpO2: 98%     Body mass index is 22.13 kg/m².    Current Outpatient Medications:     Cholecalciferol (VITAMIN D3) 2000 units tablet, Take 2 tablets by mouth Daily., Disp: , Rfl:     multivitamin with minerals tablet tablet, Take 1 tablet by mouth Daily., Disp: , Rfl:  [email or web address]  Lab on 04/24/2024   Component Date Value Ref Range Status    WBC  04/24/2024 4.06  3.40 - 10.80 10*3/mm3 Final    RBC 04/24/2024 4.19  3.77 - 5.28 10*6/mm3 Final    Hemoglobin 04/24/2024 13.6  12.0 - 15.9 g/dL Final    Hematocrit 04/24/2024 40.4  34.0 - 46.6 % Final    MCV 04/24/2024 96.4  79.0 - 97.0 fL Final    MCH 04/24/2024 32.5  26.6 - 33.0 pg Final    MCHC 04/24/2024 33.7  31.5 - 35.7 g/dL Final    RDW 04/24/2024 11.8 (L)  12.3 - 15.4 % Final    Platelets 04/24/2024 224  140 - 450 10*3/mm3 Final    Neutrophil Rel % 04/24/2024 49.3  42.7 - 76.0 % Final    Lymphocyte Rel % 04/24/2024 41.6  19.6 - 45.3 % Final    Monocyte Rel % 04/24/2024 6.7  5.0 - 12.0 % Final    Eosinophil Rel % 04/24/2024 2.2  0.3 - 6.2 % Final    Basophil Rel % 04/24/2024 0.2  0.0 - 1.5 % Final    Neutrophils Absolute 04/24/2024 2.00  1.70 - 7.00 10*3/mm3 Final    Lymphocytes Absolute 04/24/2024 1.69  0.70 - 3.10 10*3/mm3 Final    Monocytes Absolute 04/24/2024 0.27  0.10 - 0.90 10*3/mm3 Final    Eosinophils Absolute 04/24/2024 0.09  0.00 - 0.40 10*3/mm3 Final    Basophils Absolute 04/24/2024 0.01  0.00 - 0.20 10*3/mm3 Final    Immature Granulocyte Rel % 04/24/2024 0.0  0.0 - 0.5 % Final    Immature Grans Absolute 04/24/2024 0.00  0.00 - 0.05 10*3/mm3 Final    nRBC 04/24/2024 0.0  0.0 - 0.2 /100 WBC Final    Glucose 04/24/2024 103 (H)  65 - 99 mg/dL Final    BUN 04/24/2024 14  6 - 20 mg/dL Final    Creatinine 04/24/2024 0.84  0.57 - 1.00 mg/dL Final    EGFR Result 04/24/2024 89.7  >60.0 mL/min/1.73 Final    Comment: GFR Normal >60  Chronic Kidney Disease <60  Kidney Failure <15      BUN/Creatinine Ratio 04/24/2024 16.7  7.0 - 25.0 Final    Sodium 04/24/2024 139  136 - 145 mmol/L Final    Potassium 04/24/2024 4.6  3.5 - 5.2 mmol/L Final    Chloride 04/24/2024 105  98 - 107 mmol/L Final    Total CO2 04/24/2024 24.6  22.0 - 29.0 mmol/L Final    Calcium 04/24/2024 9.7  8.6 - 10.5 mg/dL Final    Total Protein 04/24/2024 7.1  6.0 - 8.5 g/dL Final    Albumin 04/24/2024 4.8  3.5 - 5.2 g/dL Final    Globulin  04/24/2024 2.3  gm/dL Final    A/G Ratio 04/24/2024 2.1  g/dL Final    Total Bilirubin 04/24/2024 0.4  0.0 - 1.2 mg/dL Final    Alkaline Phosphatase 04/24/2024 51  39 - 117 U/L Final    AST (SGOT) 04/24/2024 18  1 - 32 U/L Final    ALT (SGPT) 04/24/2024 19  1 - 33 U/L Final    Total Cholesterol 04/24/2024 217 (H)  0 - 200 mg/dL Final    Comment: Cholesterol Reference Ranges  (U.S. Department of Health and Human Services ATP III  Classifications)  Desirable          <200 mg/dL  Borderline High    200-239 mg/dL  High Risk          >240 mg/dL  Triglyceride Reference Ranges  (U.S. Department of Health and Human Services ATP III  Classifications)  Normal           <150 mg/dL  Borderline High  150-199 mg/dL  High             200-499 mg/dL  Very High        >500 mg/dL  HDL Reference Ranges  (U.S. Department of Health and Human Services ATP III  Classifications)  Low     <40 mg/dl (major risk factor for CHD)  High    >60 mg/dl ('negative' risk factor for CHD)  LDL Reference Ranges  (U.S. Department of Health and Human Services ATP III  Classifications)  Optimal          <100 mg/dL  Near Optimal     100-129 mg/dL  Borderline High  130-159 mg/dL  High             160-189 mg/dL  Very High        >189 mg/dL      Triglycerides 04/24/2024 103  0 - 150 mg/dL Final    HDL Cholesterol 04/24/2024 68 (H)  40 - 60 mg/dL Final    VLDL Cholesterol Cirilo 04/24/2024 18  5 - 40 mg/dL Final    LDL Chol Calc ([facility]) 04/24/2024 131 (H)  0 - 100 mg/dL Final    Chol/HDL Ratio 04/24/2024 3.19   Final    TSH 04/24/2024 1.810  0.270 - 4.200 uIU/mL Final    Hemoglobin A1C 04/24/2024 5.40  4.80 - 5.60 % Final    Comment: Hemoglobin A1C Ranges:  Increased Risk for Diabetes  5.7% to 6.4%  Diabetes                     >= 6.5%  Diabetic Goal                < 7.0%      25 Hydroxy, Vitamin D 04/24/2024 32.1  30.0 - 100.0 ng/ml Final    Comment: Reference Range for Total Vitamin D 25(OH)  Deficiency <20.0 ng/mL  Insufficiency 21-29 ng/mL  Sufficiency   ng/mL  Toxicity >100 ng/ml        Assessment & Plan   Diagnoses and all orders for this visit:    1. Health care maintenance (Primary)    2. Vitamin D deficiency    3. Hyperglycemia                 1. Preventative counseling-  continue to exercise and eat a healthy diet   2. Vit D def- increase from 3000 to 5000 IU daily, recheck in 3 months  3. Hyperglycemia- she has worn a CGM in the past and it has shown hypoglycemia, can place CGM today to see if anything has changed.     GYN- UTD, neg BRCA testing

## 2024-08-01 DIAGNOSIS — E55.9 VITAMIN D DEFICIENCY: Primary | ICD-10-CM

## 2024-08-02 LAB — 25(OH)D3+25(OH)D2 SERPL-MCNC: 80.1 NG/ML (ref 30–100)

## 2025-06-26 DIAGNOSIS — R73.9 HYPERGLYCEMIA: ICD-10-CM

## 2025-06-26 DIAGNOSIS — E55.9 VITAMIN D DEFICIENCY: ICD-10-CM

## 2025-06-26 DIAGNOSIS — Z00.00 HEALTH CARE MAINTENANCE: Primary | ICD-10-CM

## 2025-07-03 LAB
25(OH)D3+25(OH)D2 SERPL-MCNC: 29.1 NG/ML (ref 30–100)
ALBUMIN SERPL-MCNC: 4.6 G/DL (ref 3.5–5.2)
ALBUMIN/GLOB SERPL: 2 G/DL
ALP SERPL-CCNC: 50 U/L (ref 39–117)
ALT SERPL-CCNC: 15 U/L (ref 1–33)
AST SERPL-CCNC: 16 U/L (ref 1–32)
BASOPHILS # BLD AUTO: 0.03 10*3/MM3 (ref 0–0.2)
BASOPHILS NFR BLD AUTO: 0.8 % (ref 0–1.5)
BILIRUB SERPL-MCNC: 0.4 MG/DL (ref 0–1.2)
BUN SERPL-MCNC: 17 MG/DL (ref 6–20)
BUN/CREAT SERPL: 20.5 (ref 7–25)
CALCIUM SERPL-MCNC: 9.3 MG/DL (ref 8.6–10.5)
CHLORIDE SERPL-SCNC: 106 MMOL/L (ref 98–107)
CHOLEST SERPL-MCNC: 229 MG/DL (ref 0–200)
CHOLEST/HDLC SERPL: 4.02 {RATIO}
CO2 SERPL-SCNC: 23.3 MMOL/L (ref 22–29)
CREAT SERPL-MCNC: 0.83 MG/DL (ref 0.57–1)
EGFRCR SERPLBLD CKD-EPI 2021: 89.8 ML/MIN/1.73
EOSINOPHIL # BLD AUTO: 0.12 10*3/MM3 (ref 0–0.4)
EOSINOPHIL NFR BLD AUTO: 3.1 % (ref 0.3–6.2)
ERYTHROCYTE [DISTWIDTH] IN BLOOD BY AUTOMATED COUNT: 11.8 % (ref 12.3–15.4)
GLOBULIN SER CALC-MCNC: 2.3 GM/DL
GLUCOSE SERPL-MCNC: 103 MG/DL (ref 65–99)
HBA1C MFR BLD: 5.2 % (ref 4.8–5.6)
HCT VFR BLD AUTO: 38.5 % (ref 34–46.6)
HDLC SERPL-MCNC: 57 MG/DL (ref 40–60)
HGB BLD-MCNC: 12.7 G/DL (ref 12–15.9)
IMM GRANULOCYTES # BLD AUTO: 0.01 10*3/MM3 (ref 0–0.05)
IMM GRANULOCYTES NFR BLD AUTO: 0.3 % (ref 0–0.5)
LDLC SERPL CALC-MCNC: 154 MG/DL (ref 0–100)
LYMPHOCYTES # BLD AUTO: 1.55 10*3/MM3 (ref 0.7–3.1)
LYMPHOCYTES NFR BLD AUTO: 39.8 % (ref 19.6–45.3)
MCH RBC QN AUTO: 31.9 PG (ref 26.6–33)
MCHC RBC AUTO-ENTMCNC: 33 G/DL (ref 31.5–35.7)
MCV RBC AUTO: 96.7 FL (ref 79–97)
MONOCYTES # BLD AUTO: 0.29 10*3/MM3 (ref 0.1–0.9)
MONOCYTES NFR BLD AUTO: 7.5 % (ref 5–12)
NEUTROPHILS # BLD AUTO: 1.89 10*3/MM3 (ref 1.7–7)
NEUTROPHILS NFR BLD AUTO: 48.5 % (ref 42.7–76)
NRBC BLD AUTO-RTO: 0 /100 WBC (ref 0–0.2)
PLATELET # BLD AUTO: 216 10*3/MM3 (ref 140–450)
POTASSIUM SERPL-SCNC: 4.6 MMOL/L (ref 3.5–5.2)
PROT SERPL-MCNC: 6.9 G/DL (ref 6–8.5)
RBC # BLD AUTO: 3.98 10*6/MM3 (ref 3.77–5.28)
SODIUM SERPL-SCNC: 139 MMOL/L (ref 136–145)
TRIGL SERPL-MCNC: 104 MG/DL (ref 0–150)
TSH SERPL DL<=0.005 MIU/L-ACNC: 1.65 UIU/ML (ref 0.27–4.2)
VLDLC SERPL CALC-MCNC: 18 MG/DL (ref 5–40)
WBC # BLD AUTO: 3.89 10*3/MM3 (ref 3.4–10.8)

## 2025-07-09 ENCOUNTER — OFFICE VISIT (OUTPATIENT)
Dept: INTERNAL MEDICINE | Facility: CLINIC | Age: 45
End: 2025-07-09
Payer: COMMERCIAL

## 2025-07-09 VITALS
BODY MASS INDEX: 23.37 KG/M2 | DIASTOLIC BLOOD PRESSURE: 62 MMHG | HEART RATE: 76 BPM | SYSTOLIC BLOOD PRESSURE: 104 MMHG | HEIGHT: 62 IN | OXYGEN SATURATION: 99 % | WEIGHT: 127 LBS

## 2025-07-09 DIAGNOSIS — E55.9 VITAMIN D DEFICIENCY: ICD-10-CM

## 2025-07-09 DIAGNOSIS — E78.00 PURE HYPERCHOLESTEROLEMIA: ICD-10-CM

## 2025-07-09 DIAGNOSIS — R73.9 HYPERGLYCEMIA: ICD-10-CM

## 2025-07-09 DIAGNOSIS — R05.3 CHRONIC COUGH: ICD-10-CM

## 2025-07-09 DIAGNOSIS — Z00.00 HEALTH CARE MAINTENANCE: Primary | ICD-10-CM

## 2025-07-09 PROCEDURE — 99396 PREV VISIT EST AGE 40-64: CPT | Performed by: NURSE PRACTITIONER

## 2025-07-09 RX ORDER — ALBUTEROL SULFATE AND BUDESONIDE 90; 80 UG/1; UG/1
2 AEROSOL, METERED RESPIRATORY (INHALATION) EVERY 4 HOURS
Qty: 32 G | Refills: 0 | Status: SHIPPED | OUTPATIENT
Start: 2025-07-09

## 2025-07-09 NOTE — PATIENT INSTRUCTIONS
1.  Preventative counseling- continue to eat healthy and exercise   2.  Hyperlipidemia-Mediterranean-style diet and exercise  3.  Hyperglycemia-A1c negative  4. Vit D def- low again, restart D3 2000 IU recheck in 3-4 months   5. Chronic couch- check CXR, could try albuterol and omeprazole, f/u if symptoms persist or worsen

## 2025-07-09 NOTE — PROGRESS NOTES
dSubjective   Sylvia Alvarado is a 45 y.o. female.     History of Present Illness   The patient is here today for CPE and lab work F/U.    Took creatinine X 3 months, had a lot of swelling and wt gain.     BMI is within normal parameters. No other follow-up for BMI required.   Saw a functional med doc. She had a CT calcium score 0. Had inflammation in her gut, gave up dairy and gluten for a while. She feels better with less of these things.   MTHFR as well, told no genetic risk.    She has started berberine and milk thistle.      Dtrs ar 13 and 10.  Sister our patient, Eugenie Jackson.   The following portions of the patient's history were reviewed and updated as appropriate: allergies, current medications, past family history, past medical history, past social history, past surgical history and problem list.    Review of Systems   Constitutional:  Negative for chills, fatigue and fever.   HENT:  Negative for ear pain, rhinorrhea and sore throat.    Eyes: Negative.    Respiratory:  Positive for cough (since URI in December, the cough is usually during the evening, non productive, not always before dinner,being outdoors does not make a difference. it does not occur daily). Negative for shortness of breath and wheezing.    Cardiovascular: Negative.    Gastrointestinal: Negative.    Endocrine: Negative for cold intolerance and heat intolerance.   Genitourinary:  Negative for breast discharge, breast lump, breast pain, difficulty urinating, dyspareunia, dysuria, flank pain, frequency, genital sores, hematuria, pelvic pain and urgency.   Musculoskeletal: Negative.    Skin: Negative.    Allergic/Immunologic: Negative for environmental allergies and food allergies.   Neurological: Negative.    Hematological: Negative.    Psychiatric/Behavioral:  Negative for dysphoric mood and suicidal ideas. The patient is not nervous/anxious.        Objective   Physical Exam  Constitutional:       Appearance: Normal appearance. She is  well-developed.      Comments: Body mass index is 23.22 kg/m².     HENT:      Right Ear: Hearing, tympanic membrane, ear canal and external ear normal.      Left Ear: Hearing, tympanic membrane, ear canal and external ear normal.      Nose: Nose normal.      Mouth/Throat:      Pharynx: Uvula midline.   Eyes:      General: Lids are normal.      Conjunctiva/sclera: Conjunctivae normal.      Pupils: Pupils are equal, round, and reactive to light.   Neck:      Thyroid: No thyroid mass or thyromegaly.      Vascular: No carotid bruit.      Trachea: Trachea normal.   Cardiovascular:      Rate and Rhythm: Normal rate and regular rhythm.      Pulses: Normal pulses.      Heart sounds: Normal heart sounds.   Pulmonary:      Effort: Pulmonary effort is normal.      Breath sounds: Normal breath sounds.   Abdominal:      General: Bowel sounds are normal.      Palpations: Abdomen is soft.      Tenderness: There is no abdominal tenderness.   Musculoskeletal:         General: Normal range of motion.      Cervical back: Normal range of motion.   Lymphadenopathy:      Cervical: No cervical adenopathy.      Upper Body:      Right upper body: No supraclavicular adenopathy.      Left upper body: No supraclavicular adenopathy.      Lower Body: No right inguinal adenopathy. No left inguinal adenopathy.   Skin:     General: Skin is warm and dry.   Neurological:      Mental Status: She is alert and oriented to person, place, and time.      GCS: GCS eye subscore is 4. GCS verbal subscore is 5. GCS motor subscore is 6.      Cranial Nerves: No cranial nerve deficit.      Sensory: No sensory deficit.      Deep Tendon Reflexes:      Reflex Scores:       Patellar reflexes are 2+ on the right side and 2+ on the left side.  Psychiatric:         Speech: Speech normal.         Behavior: Behavior normal. Behavior is cooperative.         Thought Content: Thought content normal.         Judgment: Judgment normal.         Vitals:    07/09/25 0959   BP:  104/62   Pulse: 76   SpO2: 99%     Body mass index is 23.22 kg/m².      Current Outpatient Medications:     Cholecalciferol (VITAMIN D3) 2000 units tablet, Take 2 tablets by mouth Daily., Disp: , Rfl:     Albuterol-Budesonide (Airsupra) 90-80 MCG/ACT aerosol, Inhale 2 puffs Every 4 (Four) Hours., Disp: 32 g, Rfl: 0   Assessment & Plan   Diagnoses and all orders for this visit:    1. Health care maintenance (Primary)    2. Pure hypercholesterolemia    3. Hyperglycemia    4. Chronic cough  -     XR Chest PA & Lateral; Future  -     Albuterol-Budesonide (Airsupra) 90-80 MCG/ACT aerosol; Inhale 2 puffs Every 4 (Four) Hours.  Dispense: 32 g; Refill: 0    5. Vitamin D deficiency  -     Vitamin D,25-Hydroxy; Future        Orders Only on 06/26/2025   Component Date Value Ref Range Status    WBC 07/02/2025 3.89  3.40 - 10.80 10*3/mm3 Final    RBC 07/02/2025 3.98  3.77 - 5.28 10*6/mm3 Final    Hemoglobin 07/02/2025 12.7  12.0 - 15.9 g/dL Final    Hematocrit 07/02/2025 38.5  34.0 - 46.6 % Final    MCV 07/02/2025 96.7  79.0 - 97.0 fL Final    MCH 07/02/2025 31.9  26.6 - 33.0 pg Final    MCHC 07/02/2025 33.0  31.5 - 35.7 g/dL Final    RDW 07/02/2025 11.8 (L)  12.3 - 15.4 % Final    Platelets 07/02/2025 216  140 - 450 10*3/mm3 Final    Neutrophil Rel % 07/02/2025 48.5  42.7 - 76.0 % Final    Lymphocyte Rel % 07/02/2025 39.8  19.6 - 45.3 % Final    Monocyte Rel % 07/02/2025 7.5  5.0 - 12.0 % Final    Eosinophil Rel % 07/02/2025 3.1  0.3 - 6.2 % Final    Basophil Rel % 07/02/2025 0.8  0.0 - 1.5 % Final    Neutrophils Absolute 07/02/2025 1.89  1.70 - 7.00 10*3/mm3 Final    Lymphocytes Absolute 07/02/2025 1.55  0.70 - 3.10 10*3/mm3 Final    Monocytes Absolute 07/02/2025 0.29  0.10 - 0.90 10*3/mm3 Final    Eosinophils Absolute 07/02/2025 0.12  0.00 - 0.40 10*3/mm3 Final    Basophils Absolute 07/02/2025 0.03  0.00 - 0.20 10*3/mm3 Final    Immature Granulocyte Rel % 07/02/2025 0.3  0.0 - 0.5 % Final    Immature Grans Absolute 07/02/2025  0.01  0.00 - 0.05 10*3/mm3 Final    nRBC 07/02/2025 0.0  0.0 - 0.2 /100 WBC Final    Glucose 07/02/2025 103 (H)  65 - 99 mg/dL Final    BUN 07/02/2025 17.0  6.0 - 20.0 mg/dL Final    Creatinine 07/02/2025 0.83  0.57 - 1.00 mg/dL Final    EGFR Result 07/02/2025 89.8  >60.0 mL/min/1.73 Final    Comment: GFR Categories in Chronic Kidney Disease (CKD)  GFR Category          GFR (mL/min/1.73)    Interpretation  G1                    90 or greater        Normal or high  (1)  G2                    60-89                Mild decrease  (1)  G3a                   45-59                Mild to moderate  decrease  G3b                   30-44                Moderate to  severe decrease  G4                    15-29                Severe decrease  G5                    14 or less           Kidney failure  (1)In the absence of evidence of kidney disease, neither  GFR category G1 or G2 fulfill the criteria for CKD.  eGFR calculation 2021 CKD-EPI creatinine equation, which  does not include race as a factor      BUN/Creatinine Ratio 07/02/2025 20.5  7.0 - 25.0 Final    Sodium 07/02/2025 139  136 - 145 mmol/L Final    Potassium 07/02/2025 4.6  3.5 - 5.2 mmol/L Final    Chloride 07/02/2025 106  98 - 107 mmol/L Final    Total CO2 07/02/2025 23.3  22.0 - 29.0 mmol/L Final    Calcium 07/02/2025 9.3  8.6 - 10.5 mg/dL Final    Total Protein 07/02/2025 6.9  6.0 - 8.5 g/dL Final    Albumin 07/02/2025 4.6  3.5 - 5.2 g/dL Final    Globulin 07/02/2025 2.3  gm/dL Final    A/G Ratio 07/02/2025 2.0  g/dL Final    Total Bilirubin 07/02/2025 0.4  0.0 - 1.2 mg/dL Final    Alkaline Phosphatase 07/02/2025 50  39 - 117 U/L Final    AST (SGOT) 07/02/2025 16  1 - 32 U/L Final    ALT (SGPT) 07/02/2025 15  1 - 33 U/L Final    Total Cholesterol 07/02/2025 229 (H)  0 - 200 mg/dL Final    Comment: Cholesterol Reference Ranges  (U[place] Department of Health and Human Services ATP III  Classifications)  Desirable          <200 mg/dL  Borderline High    200-239  mg/dL  High Risk          >240 mg/dL  Triglyceride Reference Ranges  (U.S. Department of Health and Human Services ATP III  Classifications)  Normal           <150 mg/dL  Borderline High  150-199 mg/dL  High             200-499 mg/dL  Very High        >500 mg/dL  HDL Reference Ranges  (U.S. Department of Health and Human Services ATP III  Classifications)  Low     <40 mg/dl (major risk factor for CHD)  High    >60 mg/dl ('negative' risk factor for CHD)  LDL Reference Ranges  (U.S. Department of Health and Human Services ATP III  Classifications)  Optimal          <100 mg/dL  Near Optimal     100-129 mg/dL  Borderline High  130-159 mg/dL  High             160-189 mg/dL  Very High        >189 mg/dL  LDL is calculated using the NIH LDL-C calculation.      Triglycerides 07/02/2025 104  0 - 150 mg/dL Final    HDL Cholesterol 07/02/2025 57  40 - 60 mg/dL Final    VLDL Cholesterol [place] 07/02/2025 18  5 - 40 mg/dL Final    LDL Chol Calc ([facility]) 07/02/2025 154 (H)  0 - 100 mg/dL Final    Chol/HDL Ratio 07/02/2025 4.02   Final    TSH 07/02/2025 1.650  0.270 - 4.200 uIU/mL Final    25 Hydroxy, Vitamin D 07/02/2025 29.1 (L)  30.0 - 100.0 ng/ml Final    Comment: Reference Range for Total Vitamin D 25(OH)  Deficiency <20.0 ng/mL  Insufficiency 21-29 ng/mL  Sufficiency  ng/mL  Toxicity >100 ng/ml      Hemoglobin A1C 07/02/2025 5.20  4.80 - 5.60 % Final    Comment: Hemoglobin A1C Ranges:  Increased Risk for Diabetes  5.7% to 6.4%  Diabetes                     >= 6.5%  Diabetic Goal                < 7.0%               1.  Preventative counseling- continue to eat healthy and exercise   2.  Hyperlipidemia-Mediterranean-style diet and exercise  3.  Hyperglycemia-A1c negative  4. Vit D def- low again, restart D3 2000 IU recheck in 3-4 months   5. Chronic couch- check CXR, could try albuterol and omeprazole, f/u if symptoms persist or worsen     GYN- UTD, has neg BRCA testing

## 2025-07-11 DIAGNOSIS — R05.3 CHRONIC COUGH: ICD-10-CM

## 2025-07-15 ENCOUNTER — TELEPHONE (OUTPATIENT)
Dept: INTERNAL MEDICINE | Facility: CLINIC | Age: 45
End: 2025-07-15